# Patient Record
Sex: MALE | NOT HISPANIC OR LATINO | Employment: FULL TIME | URBAN - METROPOLITAN AREA
[De-identification: names, ages, dates, MRNs, and addresses within clinical notes are randomized per-mention and may not be internally consistent; named-entity substitution may affect disease eponyms.]

---

## 2019-01-03 ENCOUNTER — NURSE TRIAGE (OUTPATIENT)
Dept: NURSING | Facility: CLINIC | Age: 34
End: 2019-01-03

## 2019-01-03 ENCOUNTER — OFFICE VISIT (OUTPATIENT)
Dept: URGENT CARE | Facility: URGENT CARE | Age: 34
End: 2019-01-03
Payer: COMMERCIAL

## 2019-01-03 VITALS
DIASTOLIC BLOOD PRESSURE: 84 MMHG | WEIGHT: 143.7 LBS | OXYGEN SATURATION: 98 % | RESPIRATION RATE: 16 BRPM | HEART RATE: 86 BPM | TEMPERATURE: 98.1 F | SYSTOLIC BLOOD PRESSURE: 100 MMHG

## 2019-01-03 DIAGNOSIS — J20.9 ACUTE BRONCHITIS, UNSPECIFIED ORGANISM: Primary | ICD-10-CM

## 2019-01-03 PROCEDURE — 99203 OFFICE O/P NEW LOW 30 MIN: CPT | Performed by: FAMILY MEDICINE

## 2019-01-03 RX ORDER — BENZONATATE 200 MG/1
200 CAPSULE ORAL 3 TIMES DAILY PRN
Qty: 21 CAPSULE | Refills: 0 | Status: SHIPPED | OUTPATIENT
Start: 2019-01-03 | End: 2019-01-10

## 2019-01-03 NOTE — TELEPHONE ENCOUNTER
"PHarmacy asking about benzonatate prescription.  Prescription was sent to the wrong pharmacy.  I gave a verbal order to the pharmacist:    \"  benzonatate (TESSALON) 200 MG capsule 21 capsule 0 1/3/2019 1/10/2019 --   Sig - Route: Take 1 capsule (200 mg) by mouth 3 times daily as needed for cough - Oral     Becca RHOADES RN [facility]     "

## 2019-01-03 NOTE — PROGRESS NOTES
SUBJECTIVE: Manfred Morales is a 33 year old male presenting with a chief complaint of nasal congestion and cough .  Onset of symptoms was 1 week(s) ago.  Course of illness is same.    Severity moderate  Current and Associated symptoms: cough - non-productive  Treatment measures tried include Tylenol/Ibuprofen.  Predisposing factors include None.    No past medical history on file.  Not on File  Social History     Tobacco Use     Smoking status: Never Smoker     Smokeless tobacco: Never Used   Substance Use Topics     Alcohol use: Not on file       ROS:  SKIN: no rash  GI: no vomiting    OBJECTIVE:  /84   Pulse 86   Temp 98.1  F (36.7  C) (Oral)   Resp 16   Wt 65.2 kg (143 lb 11.2 oz)   SpO2 98% GENERAL APPEARANCE: healthy, alert and no distress  EYES: EOMI,  PERRL, conjunctiva clear  HENT: ear canals and TM's normal.  Nose and mouth without ulcers, erythema or lesions  NECK: supple, nontender, no lymphadenopathy  RESP: lungs clear to auscultation - no rales, rhonchi or wheezes  SKIN: no suspicious lesions or rashes      ICD-10-CM    1. Acute bronchitis, unspecified organism J20.9 benzonatate (TESSALON) 200 MG capsule       Fluids/Rest, f/u if worse/not any better

## 2022-07-07 ENCOUNTER — OFFICE VISIT (OUTPATIENT)
Dept: URGENT CARE | Facility: URGENT CARE | Age: 37
End: 2022-07-07
Payer: COMMERCIAL

## 2022-07-07 VITALS
RESPIRATION RATE: 16 BRPM | HEART RATE: 73 BPM | OXYGEN SATURATION: 99 % | SYSTOLIC BLOOD PRESSURE: 115 MMHG | DIASTOLIC BLOOD PRESSURE: 80 MMHG | TEMPERATURE: 98.3 F | WEIGHT: 154 LBS

## 2022-07-07 DIAGNOSIS — R42 VERTIGO: Primary | ICD-10-CM

## 2022-07-07 PROCEDURE — 99203 OFFICE O/P NEW LOW 30 MIN: CPT | Performed by: NURSE PRACTITIONER

## 2022-07-07 NOTE — PATIENT INSTRUCTIONS
Try Meclizine from the pharmacy. Try this first.    Pseudoephedrine from behind the pharmacy counter, Take per package instructions.

## 2022-07-07 NOTE — PROGRESS NOTES
Chief Complaint   Patient presents with     Dizziness     Pt states he has been getting dizzy with certain movements for the last week          ICD-10-CM    1. Vertigo  R42    May try meclizine or pseudoephedrine for symptoms.  If symptoms persist beyond a couple of weeks would follow-up with primary care provider for possible referral to physical therapy.    Subjective     Manfred Morales is an 36 year old male who presents to clinic today for feeling off balance when he changes positions, feels like the world is moving, comes with intermittent nausea.  He has had these episodes on and off for the last week.      ROS: 10 point ROS neg other than the symptoms noted above in the HPI.       Objective    /80   Pulse 73   Temp 98.3  F (36.8  C) (Tympanic)   Resp 16   Wt 69.9 kg (154 lb)   SpO2 99%   Nurses notes and VS have been reviewed.    Physical Exam       GENERAL APPEARANCE: healthy appearing, alert     EYES: PERRL, EOMI, sclera non-icteric     HENT: nose and mouth without ulcers or lesions and TM fluid bilateral     NECK: no adenopathy or asymmetry, thyroid normal to palpation     RESP: lungs clear to auscultation - no rales, rhonchi or wheezes     CV: regular rates and rhythm, no murmurs, rubs, or gallop     ABDOMEN:  soft, nontender, no HSM or masses and bowel sounds normal     MS: extremities normal- no gross deformities noted; normal muscle tone.     SKIN: no suspicious lesions or rashes     NEURO: Normal strength and tone, mentation intact and speech normal, cranial nerves II through XII are intact     PSYCH: normal thought process; no significant mood disturbance    Patient Instructions   Try Meclizine from the pharmacy. Try this first.    Pseudoephedrine from behind the pharmacy counter, Take per package instructions.        GABRIELA Downing, CNP  [facility] Provider    The use of Dragon/RescueTime dictation services may have been used to construct the content in this note; any  grammatical or spelling errors are non-intentional. Please contact the author of this note directly if you are in need of any clarification.

## 2023-01-22 ENCOUNTER — HOSPITAL ENCOUNTER (EMERGENCY)
Facility: CLINIC | Age: 38
Discharge: HOME | End: 2023-01-22
Attending: PHYSICIAN ASSISTANT | Admitting: PHYSICIAN ASSISTANT
Payer: COMMERCIAL

## 2023-01-22 VITALS
SYSTOLIC BLOOD PRESSURE: 126 MMHG | TEMPERATURE: 98.4 F | DIASTOLIC BLOOD PRESSURE: 73 MMHG | OXYGEN SATURATION: 98 % | HEIGHT: 64 IN | HEART RATE: 78 BPM | BODY MASS INDEX: 26.43 KG/M2 | RESPIRATION RATE: 18 BRPM

## 2023-01-22 DIAGNOSIS — T33.90XA FROSTBITE, INITIAL ENCOUNTER: ICD-10-CM

## 2023-01-22 DIAGNOSIS — M79.644 PAIN OF FINGER OF RIGHT HAND: ICD-10-CM

## 2023-01-22 PROCEDURE — 99282 EMERGENCY DEPT VISIT SF MDM: CPT

## 2023-01-22 PROCEDURE — 10060 I&D ABSCESS SIMPLE/SINGLE: CPT

## 2023-01-22 ASSESSMENT — ENCOUNTER SYMPTOMS
ARTHRALGIAS: 1
JOINT SWELLING: 1
COLOR CHANGE: 1

## 2023-01-22 NOTE — ED TRIAGE NOTES
Pt states had frostbite on middle finger of rt hand. Pt felt it returned again. Swelling and redness noted around nailbed      Triage Assessment     Row Name 01/22/23 0436       Triage Assessment (Adult)    Airway WDL WDL       Respiratory WDL    Respiratory WDL WDL       Skin Circulation/Temperature WDL    Skin Circulation/Temperature WDL WDL       Cardiac WDL    Cardiac WDL WDL       Peripheral/Neurovascular WDL    Peripheral Neurovascular WDL WDL       Cognitive/Neuro/Behavioral WDL    Cognitive/Neuro/Behavioral WDL WDL

## 2023-01-22 NOTE — ED PROVIDER NOTES
"  History     Chief Complaint:  Wound Check       The history is provided by the patient.      Manfred Morales is a 37 year old male who presents with wound check.  The patient reports that one month ago, he was shoveling outside in -2 degree weather and his right middle digit became swollen, painful, and red. He says that 2 days ago, he was shoveling outside again and developed worsening swelling, redness and burning to the finger. He mentions that while shoveling, he was using the shovel to repeatedly clear snow. Patient states that the symptoms from a month ago were constant, and became worse 2 days ago. He is right hand dominant. Patient states that in both instances, he was wearing gloves and would occasionally take them off. He denies discharge, bleeding, and blistering of the finger. Patient mentions putting aloe on his finger yesterday.     Independent Historian: Patient     Review of External Notes: none     ROS:  Review of Systems   Musculoskeletal: Positive for arthralgias (right middle digit) and joint swelling (right middle digit).   Skin: Positive for color change (right middle finger redness).   All other systems reviewed and are negative.    Allergies:  No Known Allergies     Medications:    Patient denies taking any current medications.    Past Medical History:    Patient denies medical history.    Social History:  Patient unaccompanied to the ED.  PCP: No Ref-Primary, Physician     Physical Exam     Patient Vitals for the past 24 hrs:   BP Temp Temp src Pulse Resp SpO2 Height   01/22/23 1335 126/73 98.4  F (36.9  C) Oral 78 18 98 % 1.626 m (5' 4\")        Physical Exam  General: Well appearing, pleasant male, resting on exam bed  HEENT: No evidence of trauma.  Conjunctive are clear. Neck range of motion intact.  Nose and throat clear.  Respiratory: Good effort  Cardiovascular: Good distal perfusion  Gastrointestinal: Nondistended  Musculoskeletal: Atraumatic  Skin: Exposed skin clear.  Neurologic: " Alert.  Psych:  Patient is cooperative, with normal affect.  Right middle digit: Patient has swelling throughout his DIP with minimal tenderness.  There is no definite erythema.  Patient has a possible early versus healing bullae on the radialside of his digit.  Range of motion of his DIP is intact with good strength.  Good sensation and cap refill.          Emergency Department Course     Procedures     Incision and Drainage     Procedure: Incision and Drainage     Consent: Verbal    Indication: Possible abscess versus blister    Location: right middle digit    Size: 1  cm    Ultrasound Guidance: No    Preparation: Alcohol     Anesthesia/Sedation: none     Procedure Detail:    Aspiration: Yes  Incision Type: none  Scalpel: none  Lesion Management: pressure  Wound Management: Left open , dressing applied  Packing: None     Patient Status: The patient tolerated the procedure well: Yes. There were no complications.      Emergency Department Course & Assessments:    Interventions:  Medications - No data to display     Independent Interpretation (X-rays, CTs, rhythm strip):  none     Consultations/Discussion of Management or Tests:  1322 I spoke with the patient and evaluated symptoms.       Social Determinants of Health affecting care:  Ethnicity    Disposition:  The patient was discharged to home.     Impression & Plan      Medical Decision Making:  Manfred Morales is a 37 year old male presents to the ER for evaluation of a painful right middle digit.  See HPI.  His vitals are unremarkable.  The patient reports that he suffered frostbite to this digit about a month ago.  Then 2 days ago he was repeatedly hitting snow and developed pain about the same finger afterwards.  He was wearing gloves at this time.  He has an exam as above detailing swelling and tenderness to his right third distal phalanx.  There is possibly a very faint bullae.  There is mild discoloration of the skin but no definite erythema.  See above.   This area of question was aspirated without any return of any purulent or clear material.  Therefore I feel that the patient's symptoms are likely from repeated mild trauma from the shovel.  Unlikely infection at this time.  We will refer him to the burn clinic for further evaluation of frostbite.  There is no significant incidence of trauma to the finger so I do not feel radiographs would be helpful today.  Symptomatic care otherwise is indicated and he is to return with new or worsening symptoms.  No further questions and agrees with plan.    Diagnosis:    ICD-10-CM    1. Pain of finger of right hand  M79.644       2. Frostbite, initial encounter  T33.90XA            Discharge Medications:  New Prescriptions    No medications on file        Scribe Disclosure:  I, Suzy Obregon, am serving as a scribe at 3:23 PM on 1/22/2023 to document services personally performed by Danny Forte PA-C based on my observations and the provider's statements to me.    1/22/2023   Danny Forte PA-C Cyr, Matthew R, PA-C  01/22/23 1602

## 2024-08-12 ENCOUNTER — HOSPITAL ENCOUNTER (EMERGENCY)
Facility: CLINIC | Age: 39
Discharge: HOME | End: 2024-08-13
Attending: EMERGENCY MEDICINE | Admitting: EMERGENCY MEDICINE
Payer: COMMERCIAL

## 2024-08-12 DIAGNOSIS — U07.1 INFECTION DUE TO 2019 NOVEL CORONAVIRUS: ICD-10-CM

## 2024-08-12 LAB
FLUAV RNA SPEC QL NAA+PROBE: NEGATIVE
FLUBV RNA RESP QL NAA+PROBE: NEGATIVE
GROUP A STREP BY PCR: NOT DETECTED
RSV RNA SPEC NAA+PROBE: NEGATIVE
SARS-COV-2 RNA RESP QL NAA+PROBE: POSITIVE

## 2024-08-12 PROCEDURE — 87637 SARSCOV2&INF A&B&RSV AMP PRB: CPT | Performed by: EMERGENCY MEDICINE

## 2024-08-12 PROCEDURE — 99284 EMERGENCY DEPT VISIT MOD MDM: CPT | Mod: 25

## 2024-08-12 PROCEDURE — 87651 STREP A DNA AMP PROBE: CPT | Performed by: EMERGENCY MEDICINE

## 2024-08-12 PROCEDURE — 87651 STREP A DNA AMP PROBE: CPT | Performed by: STUDENT IN AN ORGANIZED HEALTH CARE EDUCATION/TRAINING PROGRAM

## 2024-08-12 PROCEDURE — 87637 SARSCOV2&INF A&B&RSV AMP PRB: CPT | Performed by: STUDENT IN AN ORGANIZED HEALTH CARE EDUCATION/TRAINING PROGRAM

## 2024-08-12 PROCEDURE — 250N000013 HC RX MED GY IP 250 OP 250 PS 637: Performed by: STUDENT IN AN ORGANIZED HEALTH CARE EDUCATION/TRAINING PROGRAM

## 2024-08-12 RX ORDER — ACETAMINOPHEN 500 MG
1000 TABLET ORAL ONCE
Status: COMPLETED | OUTPATIENT
Start: 2024-08-12 | End: 2024-08-12

## 2024-08-12 RX ADMIN — ACETAMINOPHEN 1000 MG: 500 TABLET, FILM COATED ORAL at 22:14

## 2024-08-12 ASSESSMENT — COLUMBIA-SUICIDE SEVERITY RATING SCALE - C-SSRS
6. HAVE YOU EVER DONE ANYTHING, STARTED TO DO ANYTHING, OR PREPARED TO DO ANYTHING TO END YOUR LIFE?: NO
1. IN THE PAST MONTH, HAVE YOU WISHED YOU WERE DEAD OR WISHED YOU COULD GO TO SLEEP AND NOT WAKE UP?: NO
2. HAVE YOU ACTUALLY HAD ANY THOUGHTS OF KILLING YOURSELF IN THE PAST MONTH?: NO

## 2024-08-12 ASSESSMENT — ACTIVITIES OF DAILY LIVING (ADL): ADLS_ACUITY_SCORE: 35

## 2024-08-12 NOTE — LETTER
August 13, 2024      To Whom It May Concern:      Manfred Morales was seen in our Emergency Department today, 08/13/24.  Please excuse him from work until 8/20/24.    Sincerely,        Yokasta Zuniga MD

## 2024-08-13 VITALS
RESPIRATION RATE: 18 BRPM | DIASTOLIC BLOOD PRESSURE: 71 MMHG | OXYGEN SATURATION: 98 % | SYSTOLIC BLOOD PRESSURE: 113 MMHG | TEMPERATURE: 98.9 F | HEART RATE: 100 BPM

## 2024-08-13 LAB
ANION GAP SERPL CALCULATED.3IONS-SCNC: 9 MMOL/L (ref 7–15)
ATRIAL RATE - MUSE: 86 BPM
BASOPHILS # BLD AUTO: 0.1 10E3/UL (ref 0–0.2)
BASOPHILS NFR BLD AUTO: 1 %
BUN SERPL-MCNC: 17.2 MG/DL (ref 6–20)
CALCIUM SERPL-MCNC: 9.3 MG/DL (ref 8.8–10.4)
CHLORIDE SERPL-SCNC: 97 MMOL/L (ref 98–107)
CREAT SERPL-MCNC: 0.77 MG/DL (ref 0.67–1.17)
DIASTOLIC BLOOD PRESSURE - MUSE: NORMAL MMHG
EGFRCR SERPLBLD CKD-EPI 2021: >90 ML/MIN/1.73M2
EOSINOPHIL # BLD AUTO: 0.1 10E3/UL (ref 0–0.7)
EOSINOPHIL NFR BLD AUTO: 1 %
ERYTHROCYTE [DISTWIDTH] IN BLOOD BY AUTOMATED COUNT: 12.4 % (ref 10–15)
GLUCOSE SERPL-MCNC: 102 MG/DL (ref 70–99)
HCO3 SERPL-SCNC: 27 MMOL/L (ref 22–29)
HCT VFR BLD AUTO: 43.7 % (ref 40–53)
HGB BLD-MCNC: 15.1 G/DL (ref 13.3–17.7)
IMM GRANULOCYTES # BLD: 0.1 10E3/UL
IMM GRANULOCYTES NFR BLD: 1 %
INTERPRETATION ECG - MUSE: NORMAL
LYMPHOCYTES # BLD AUTO: 1.3 10E3/UL (ref 0.8–5.3)
LYMPHOCYTES NFR BLD AUTO: 13 %
MCH RBC QN AUTO: 29.4 PG (ref 26.5–33)
MCHC RBC AUTO-ENTMCNC: 34.6 G/DL (ref 31.5–36.5)
MCV RBC AUTO: 85 FL (ref 78–100)
MONOCYTES # BLD AUTO: 0.7 10E3/UL (ref 0–1.3)
MONOCYTES NFR BLD AUTO: 7 %
NEUTROPHILS # BLD AUTO: 8.2 10E3/UL (ref 1.6–8.3)
NEUTROPHILS NFR BLD AUTO: 79 %
NRBC # BLD AUTO: 0 10E3/UL
NRBC BLD AUTO-RTO: 0 /100
P AXIS - MUSE: 59 DEGREES
PLATELET # BLD AUTO: 336 10E3/UL (ref 150–450)
POTASSIUM SERPL-SCNC: 4.4 MMOL/L (ref 3.4–5.3)
PR INTERVAL - MUSE: 170 MS
QRS DURATION - MUSE: 90 MS
QT - MUSE: 346 MS
QTC - MUSE: 414 MS
R AXIS - MUSE: 77 DEGREES
RBC # BLD AUTO: 5.13 10E6/UL (ref 4.4–5.9)
SODIUM SERPL-SCNC: 133 MMOL/L (ref 135–145)
SYSTOLIC BLOOD PRESSURE - MUSE: NORMAL MMHG
T AXIS - MUSE: 31 DEGREES
VENTRICULAR RATE- MUSE: 86 BPM
WBC # BLD AUTO: 10.4 10E3/UL (ref 4–11)

## 2024-08-13 PROCEDURE — 80048 BASIC METABOLIC PNL TOTAL CA: CPT | Performed by: EMERGENCY MEDICINE

## 2024-08-13 PROCEDURE — 250N000013 HC RX MED GY IP 250 OP 250 PS 637: Performed by: EMERGENCY MEDICINE

## 2024-08-13 PROCEDURE — 258N000003 HC RX IP 258 OP 636: Performed by: EMERGENCY MEDICINE

## 2024-08-13 PROCEDURE — 96360 HYDRATION IV INFUSION INIT: CPT

## 2024-08-13 PROCEDURE — 36415 COLL VENOUS BLD VENIPUNCTURE: CPT | Performed by: EMERGENCY MEDICINE

## 2024-08-13 PROCEDURE — 93005 ELECTROCARDIOGRAM TRACING: CPT

## 2024-08-13 PROCEDURE — 85025 COMPLETE CBC W/AUTO DIFF WBC: CPT | Performed by: EMERGENCY MEDICINE

## 2024-08-13 RX ORDER — ONDANSETRON 4 MG/1
4 TABLET, ORALLY DISINTEGRATING ORAL EVERY 6 HOURS PRN
Qty: 15 TABLET | Refills: 0 | Status: SHIPPED | OUTPATIENT
Start: 2024-08-13

## 2024-08-13 RX ORDER — IBUPROFEN 400 MG/1
400 TABLET, FILM COATED ORAL ONCE
Status: COMPLETED | OUTPATIENT
Start: 2024-08-13 | End: 2024-08-13

## 2024-08-13 RX ORDER — BENZONATATE 200 MG/1
200 CAPSULE ORAL 3 TIMES DAILY PRN
Qty: 21 CAPSULE | Refills: 0 | Status: SHIPPED | OUTPATIENT
Start: 2024-08-13

## 2024-08-13 RX ADMIN — SODIUM CHLORIDE 1000 ML: 9 INJECTION, SOLUTION INTRAVENOUS at 01:09

## 2024-08-13 RX ADMIN — IBUPROFEN 400 MG: 400 TABLET, FILM COATED ORAL at 01:07

## 2024-08-13 ASSESSMENT — ACTIVITIES OF DAILY LIVING (ADL)
ADLS_ACUITY_SCORE: 35
ADLS_ACUITY_SCORE: 35

## 2024-08-13 NOTE — DISCHARGE INSTRUCTIONS
Take Tylenol or ibuprofen as needed for pain or fever    Drink plenty of fluids.    Isolate yourself for the next 1 week    Start the Paxlovid as soon as possible.

## 2024-08-13 NOTE — ED PROVIDER NOTES
Emergency Department Note      History of Present Illness     Chief Complaint   Fever and Cough      HPI   Manfred Morales is a 38 year old male who arrives to the emergency department due to dizziness, fever and cough.  The patient reports that he was feeling well until earlier this morning when he began with feeling feverish, cough, nausea and lightheadedness which progressed throughout the day.  He vomited once this morning.  He denies headache, sore throat, neck pain or stiffness, chest pain, shortness of breath, abdominal pain.    Independent Historian   None    Review of External Notes   none    Past Medical History     Medical History and Problem List   none    Medications   None    NKDA    Surgical History   No past surgical history on file.    Physical Exam     Patient Vitals for the past 24 hrs:   BP Temp Temp src Pulse Resp SpO2   08/13/24 0110 109/70 98.9  F (37.2  C) Oral 88 -- 98 %   08/12/24 2208 (!) 141/83 (!) 101.7  F (38.7  C) Oral (!) 121 17 99 %     Physical Exam  Nursing note and vitals reviewed.  Constitutional:  Appears well-developed and well-nourished.   HENT:   Head:    Atraumatic. TM's clear  Mouth/Throat:   Oropharynx is clear and moist. No oropharyngeal exudate.   Eyes:    Pupils are equal, round, and reactive to light.   Neck:    Normal range of motion. Neck supple.      No tracheal deviation present. No thyromegaly present.   Cardiovascular:  Normal rate, regular rhythm, no murmur   Pulmonary/Chest: Breath sounds are clear and equal without wheezes or crackles.  Abdominal:   Soft. Bowel sounds are normal. Exhibits no distension and      no mass. There is no tenderness.      There is no rebound and no guarding.   Musculoskeletal:  Exhibits no edema.   Lymphadenopathy:  No cervical adenopathy.   Neurological:   Alert and oriented to person, place, and time.   Skin:    Skin is warm and dry. No rash noted. No pallor.  yeah yeah yeah this is    Diagnostics     Lab Results   Labs Ordered  and Resulted from Time of ED Arrival to Time of ED Departure   INFLUENZA A/B, RSV, & SARS-COV2 PCR - Abnormal       Result Value    Influenza A PCR Negative      Influenza B PCR Negative      RSV PCR Negative      SARS CoV2 PCR Positive (*)    GROUP A STREPTOCOCCUS PCR THROAT SWAB - Normal    Group A strep by PCR Not Detected     CBC WITH PLATELETS AND DIFFERENTIAL    WBC Count 10.4      RBC Count 5.13      Hemoglobin 15.1      Hematocrit 43.7      MCV 85      MCH 29.4      MCHC 34.6      RDW 12.4      Platelet Count 336      % Neutrophils 79      % Lymphocytes 13      % Monocytes 7      % Eosinophils 1      % Basophils 1      % Immature Granulocytes 1      NRBCs per 100 WBC 0      Absolute Neutrophils 8.2      Absolute Lymphocytes 1.3      Absolute Monocytes 0.7      Absolute Eosinophils 0.1      Absolute Basophils 0.1      Absolute Immature Granulocytes 0.1      Absolute NRBCs 0.0     BASIC METABOLIC PANEL       Imaging   No orders to display       EKG   ECG taken at 8/13/24 at 01:38, ECG read on 8/13/24 at 01:41 by Dr. Yokasta Zuniga  Normal Sinus Rhythm  Ventricular Rate 86 bpm. IL interval 170 ms. QRS duration 90 ms. QT/QTc 346/414 ms. P-R-T axes 59 [id number] 31.    Independent Interpretation   None    ED Course      Medications Administered   Medications   sodium chloride 0.9% BOLUS 1,000 mL (1,000 mLs Intravenous $New Bag 8/13/24 0109)   acetaminophen (TYLENOL) tablet 1,000 mg (1,000 mg Oral $Given 8/12/24 2214)   ibuprofen (ADVIL/MOTRIN) tablet 400 mg (400 mg Oral $Given 8/13/24 0107)       Procedures   Procedures     Discussion of Management   None    ED Course        Additional Documentation  None    Medical Decision Making / Diagnosis     CMS Diagnoses: None    MIPS       None    MDM   Manfred Morales is a 38 year old male who I found to have acute Covid-19 infection which is consistent with his symptoms of fever, cough, nausea and lightheadedness.  He was tachycardic and febrile when he arrived to  the emergency department, which both resolved after Tylenol and 1 L of normal saline IV fluids.  I feel that his tachycardia was due to the fever.  The symptoms are consistent with acute COVID-19 infection.  I did not feel that there was any sign of bacterial sepsis.  His lungs are clear and he is not hypoxic and he is without any chest pain or shortness of breath, so chest x-ray imaging was not indicated.  I discussed the risks and benefits of Paxlovid therapy with him and he agreed to the medication being prescribed.  His kidney function was tested and is negative so the Paxlovid was prescribed.  He was also instructed on symptomatic treatment such as Zofran for nausea and Tessalon Perles for cough, Tylenol or ibuprofen as needed for fever, and to drink plenty of fluids.  He was told to check his pulse oximeter at home and return if his oxygen level drops below 90% or for chest pain, shortness of breath, recurrent vomiting or other concerns.  He was told to call his clinic tomorrow for follow-up.  He was told to isolate for the next week and he was given a work note for 1 week off work.  I felt he could be safely discharged home.    Disposition   The patient was discharged.     Diagnosis     ICD-10-CM    1. Infection due to 2019 novel coronavirus  U07.1            Discharge Medications   New Prescriptions    BENZONATATE (TESSALON) 200 MG CAPSULE    Take 1 capsule (200 mg) by mouth 3 times daily as needed for cough (Swallow whole, do not chew)    NIRMATRELVIR AND RITONAVIR (PAXLOVID) 300 MG/100 MG THERAPY PACK    Take 3 tablets by mouth 2 times daily for 5 days    ONDANSETRON (ZOFRAN ODT) 4 MG ODT TAB    Take 1 tablet (4 mg) by mouth every 6 hours as needed for nausea or vomiting         MD Efrain Lagos, Yokasta Arthur MD  08/13/24 1555

## 2024-08-13 NOTE — ED TRIAGE NOTES
Patient here  with a fever ,cough and dizziness which started today     Triage Assessment (Adult)       Row Name 08/12/24 1686          Triage Assessment    Airway WDL WDL        Respiratory WDL    Respiratory WDL WDL        Skin Circulation/Temperature WDL    Skin Circulation/Temperature WDL WDL        Cardiac WDL    Cardiac WDL WDL        Peripheral/Neurovascular WDL    Peripheral Neurovascular WDL WDL        Cognitive/Neuro/Behavioral WDL    Cognitive/Neuro/Behavioral WDL WDL